# Patient Record
Sex: MALE | Race: WHITE | NOT HISPANIC OR LATINO | Employment: FULL TIME | URBAN - METROPOLITAN AREA
[De-identification: names, ages, dates, MRNs, and addresses within clinical notes are randomized per-mention and may not be internally consistent; named-entity substitution may affect disease eponyms.]

---

## 2017-05-30 ENCOUNTER — OFFICE VISIT (OUTPATIENT)
Dept: FAMILY MEDICINE | Facility: CLINIC | Age: 32
End: 2017-05-30
Payer: COMMERCIAL

## 2017-05-30 VITALS — HEIGHT: 74 IN | TEMPERATURE: 97.7 F | BODY MASS INDEX: 24.26 KG/M2 | WEIGHT: 189 LBS

## 2017-05-30 DIAGNOSIS — L23.7 CONTACT DERMATITIS DUE TO POISON IVY: Primary | ICD-10-CM

## 2017-05-30 PROCEDURE — 99213 OFFICE O/P EST LOW 20 MIN: CPT | Performed by: FAMILY MEDICINE

## 2017-05-30 RX ORDER — METHYLPREDNISOLONE 4 MG
TABLET, DOSE PACK ORAL
Qty: 21 TABLET | Refills: 0 | Status: SHIPPED | OUTPATIENT
Start: 2017-05-30

## 2017-05-30 NOTE — PATIENT INSTRUCTIONS
Managing a Poison Ivy, Poison Oak, or Poison Sumac Reaction    If you come in contact with urushiol  If you think you may have come in contact with the sap oil contained in these poison ivy, poison oak, and poison sumac plants (urishol), wash the affected part of your skin. Do this within 15 minutes after contact, using water or preferably, soap and water.  Undress, and wash your clothes and gear as soon as you can. Be sure to wash any pet that was with you. Taking these steps can help prevent spreading sap oil to someone else. If you have a rash, but are not sure if it is from one of these plants, consult with your health care provider.  To soothe the itching  Your skin may react to poison oak, poison ivy, and poison sumac within minutes to a few days after contact. Once you have come into contact with these plants, you can t stop the reaction. But you can take these steps to soothe the itching:    Don t scratch or scrub your rash, even if the itching is severe. Scratching can lead to infection.    Bathe in lukewarm (not hot) water or take short cool showers to relieve the itching. For a more soothing bath, add oatmeal to the water.    Use antihistamines that are taken by mouth (such as diphenhydramine). You can buy these at the drugstore. Talk to your health care provider or pharmacist for more information on oral antihistamines.    Use over-the-counter treatments on your skin, such as cortisone, compresses of Burow s solution, and calamine lotion.  How your skin may react  A mild rash may become red, swollen, and itchy. The rash may form a line on your skin where you brushed against the plant. If you have a severe rash, your itching may worsen. And your rash may blister and ooze. If this happens, seek medical care. The fluid from your blisters will not make your rash spread. With or without medical care, your rash may last up to 3 weeks. In the future, try to avoid coming in contact with these plants.  When to  call your health care provider  Call your health care provider if:    Your rash is severe    The rash spreads beyond the exposed part of your body or affects your face.    The rash does not clear up within a few weeks  You may be given medicine to take by mouth or apply directly on the skin. Go to the emergency room if you have any trouble breathing or swallowing or have any significant swelling.    3927-8955 The [employer]. [address] [place] [address]. All rights reserved. This information is not intended as a substitute for professional medical care. Always follow your healthcare professional's instructions.        Avoiding Poison Ivy, Poison Oak, and Poison Sumac  Poison oak, poison ivy, and poison sumac are plants that can cause skin rashes. The problem is urushiol, a sap oil contained in these plants. If you're allergic to urushiol, touching 1 of these plants may cause your skin to react. Within minutes or days, you may have a red, swollen, itchy rash. You can't stop the rash. But you can soothe the itching.        Recognizing these plants  You can help prevent a poison oak, poison ivy, or poison sumac rash. Know what these plants look like. And then avoid them. They grow in the form of ting, small plants, and large bushes. In most cases, poison oak and poison ivy have 3 leaves per stem. Poison sumac has from 7 leaves to 13 leaves per stem. Watch out for these plants when you go to any outdoor area, from a friend's overgrown back yard to the wilderness. Urushiol is present in these plants all year round, even when the leaves are gone. So always be on the lookout.  What causes a reaction?  Poison oak, poison ivy, and poison sumac thrive mainly in unmaintained outdoor areas. If you touch these plants, you may get a rash. You may also react if you touch something that came in contact with urushiol, such as a dog or cat, clothing, or equipment. But the rash caused by these plants is not  contagious.  Steps to prevention  When heading outdoors, take these preventive steps:    Avoid touching any of these plants.    Wear long pants and a long-sleeved shirt.    If you're going to a heavily wooded or brushy area, also put on gloves, a hat, and boots.    Apply bentoquatam 5% to all exposed areas of skin. This creates a layer of protection between your skin and any sap oil you may touch.    If you come in contact with these plants or the oil, wash with soap and water as soon as possible.    2839-5757 [employer]. [address] [place] [address]. All rights reserved. This information is not intended as a substitute for professional medical care. Always follow your healthcare professional's instructions.        Nonspecific Dermatitis  Dermatitis is a skin rash caused by something that touches the skin and makes it irritated and inflamed.  Your skin may be red, swollen, dry, and may be cracked. Blisters may form and ooze. The rash will itch.  Dermatitis can form on the face and neck, backs of hands, forearms, genitals, and lower legs. Dermatitis is not passed from person to person.  Talk with your health care provider about what may have caused the rash. Common things that cause skin allergies are metal in jewelry, plants like poison ivy or poison oak, and certain skin care products. You will need to avoid the source of your rash in the future to prevent it from coming back. In some cases, the cause of the dermatitis may not be found.  Treatment is done to relieve itching and prevent the rash from coming back. The rash should go away in a few days to a few weeks.  Home care  The health care provider may prescribe medications to relieve swelling and itching. Follow all instructions when using these medications.    Avoid anything that heats up your skin, such as hot showers or baths, or direct sunlight. This can make itching worse.    Stay away from whatever you think caused the  rash.    Bathe in warm, not hot, water. Apply a moisturizing lotion after bathing to prevent dry skin.    Avoid skin irritants such as wool or silk clothing, grease, oils, harsh soaps, and detergents.    Apply cold compresses to soothe your sores to help relieve your symptoms. Do this for 30 minutes 3 to 4 times a day. You can make a cold compress by soaking a cloth in cold water. Squeeze out excess water. You can add colloidal oatmeal to the water to help reduce itching. For severe itching in a small area, apply an ice pack wrapped in a thin towel. Do this for 20 minutes 3 to 4 times a day.    You can also help relieve large areas of itching by taking a lukewarm bath with colloidal oatmeal added to the water.    Use hydrocortisone cream for redness and irritation, unless another medicine was prescribed. You can also use benzocaine anesthetic cream or spray.    Use oral diphenhydramine to help reduce itching. This is an antihistamine you can buy at drug and grocery stores. It can make you sleepy, so use lower doses during the daytime. Or you can use loratadine. This is an antihistamine that will not make you sleepy. Don t use diphenhydramine if you have glaucoma or have trouble urinating because of an enlarged prostate.    Wash your hands or use an antibacterial gel often to prevent the spread of the rash.  Follow-up care  Follow up with your health care provider. Make an appointment with your health care provider if your symptoms do not get better in the next 1 to 2 weeks.  When to seek medical advice  Call your health care provider right away if any of these occur:    Spreading of the rash to other parts of your body    Severe swelling of your face, eyelids, mouth, throat or tongue    Trouble urinating due to swelling in the genital area    Fever of 100.4 F (38 C) or higher    Redness or swelling that gets worse    Pain that gets worse    Foul-smelling fluid leaking from the skin    Yellow-brown crusts on the open  blisters    Joint pain     8666-7719 [employer]. [address] [place] [address], [place] [address]. All rights reserved. This information is not intended as a substitute for professional medical care. Always follow your healthcare professional's instructions.        You can use Aveeno in your bathe wart

## 2017-05-30 NOTE — PROGRESS NOTES
"  SUBJECTIVE:                                                    Dipesh Gunn is a 31 year old male who presents to clinic today for the following health issues:    Possible Poison Oak - all over body - Started 10 days ago  Went into woods to get a lure     No previous     O: Temp 97.7  F (36.5  C) (Oral)  Ht 6' 2\" (1.88 m)  Wt 189 lb (85.7 kg)  BMI 24.27 kg/m2      All over face chest , back and legs     Chest wall normal to inspection and palpation. Good excursion bilaterally. Lungs clear to auscultation. Good air movement bilaterally without rales, wheezes, or rhonchi.   Regular rate and  rhythm. S1 and S2 normal, no murmurs, clicks, gallops or rubs. No edema or JVD.    The lesions on the arm are vesicular     The rest on his thorax are more irritative       ICD-10-CM    1. Contact dermatitis due to poison ivy L23.7 methylPREDNISolone (MEDROL DOSEPAK) 4 MG tablet           Reviewed and updated as needed this visit by clinical staff       Reviewed and updated as needed this visit by Provider             "

## 2017-05-30 NOTE — MR AVS SNAPSHOT
After Visit Summary   5/30/2017    Dipesh Gunn    MRN: [id number]           Patient Information     Date Of Birth          1985        Visit Information        Provider Department      5/30/2017 9:40 AM Mayito De La Torre MD [facility]        Today's Diagnoses     Contact dermatitis due to poison ivy    -  1      Care Instructions      Managing a Poison Ivy, Poison Oak, or Poison Sumac Reaction    If you come in contact with urushiol  If you think you may have come in contact with the sap oil contained in these poison ivy, poison oak, and poison sumac plants (urishol), wash the affected part of your skin. Do this within 15 minutes after contact, using water or preferably, soap and water.  Undress, and wash your clothes and gear as soon as you can. Be sure to wash any pet that was with you. Taking these steps can help prevent spreading sap oil to someone else. If you have a rash, but are not sure if it is from one of these plants, consult with your health care provider.  To soothe the itching  Your skin may react to poison oak, poison ivy, and poison sumac within minutes to a few days after contact. Once you have come into contact with these plants, you can t stop the reaction. But you can take these steps to soothe the itching:    Don t scratch or scrub your rash, even if the itching is severe. Scratching can lead to infection.    Bathe in lukewarm (not hot) water or take short cool showers to relieve the itching. For a more soothing bath, add oatmeal to the water.    Use antihistamines that are taken by mouth (such as diphenhydramine). You can buy these at the [employer]. Talk to your health care provider or pharmacist for more information on oral antihistamines.    Use over-the-counter treatments on your skin, such as cortisone, compresses of Burow s solution, and calamine lotion.  How your skin may react  A mild rash may become red, swollen, and itchy. The rash may form  a line on your skin where you brushed against the plant. If you have a severe rash, your itching may worsen. And your rash may blister and ooze. If this happens, seek medical care. The fluid from your blisters will not make your rash spread. With or without medical care, your rash may last up to 3 weeks. In the future, try to avoid coming in contact with these plants.  When to call your health care provider  Call your health care provider if:    Your rash is severe    The rash spreads beyond the exposed part of your body or affects your face.    The rash does not clear up within a few weeks  You may be given medicine to take by mouth or apply directly on the skin. Go to the emergency room if you have any trouble breathing or swallowing or have any significant swelling.    3331-9253 [employer]. [address] [place] [address]. All rights reserved. This information is not intended as a substitute for professional medical care. Always follow your healthcare professional's instructions.        Avoiding Poison Ivy, Poison Oak, and Poison Sumac  Poison oak, poison ivy, and poison sumac are plants that can cause skin rashes. The problem is urushiol, a sap oil contained in these plants. If you're allergic to urushiol, touching 1 of these plants may cause your skin to react. Within minutes or days, you may have a red, swollen, itchy rash. You can't stop the rash. But you can soothe the itching.        Recognizing these plants  You can help prevent a poison oak, poison ivy, or poison sumac rash. Know what these plants look like. And then avoid them. They grow in the form of ting, small plants, and large bushes. In most cases, poison oak and poison ivy have 3 leaves per stem. Poison sumac has from 7 leaves to 13 leaves per stem. Watch out for these plants when you go to any outdoor area, from a friend's overgrown back yard to the wilderness. Urushiol is present in these plants all year round, even  when the leaves are gone. So always be on the lookout.  What causes a reaction?  Poison oak, poison ivy, and poison sumac thrive mainly in unmaintained outdoor areas. If you touch these plants, you may get a rash. You may also react if you touch something that came in contact with urushiol, such as a dog or cat, clothing, or equipment. But the rash caused by these plants is not contagious.  Steps to prevention  When heading outdoors, take these preventive steps:    Avoid touching any of these plants.    Wear long pants and a long-sleeved shirt.    If you're going to a heavily wooded or brushy area, also put on gloves, a hat, and boots.    Apply bentoquatam 5% to all exposed areas of skin. This creates a layer of protection between your skin and any sap oil you may touch.    If you come in contact with these plants or the oil, wash with soap and water as soon as possible.    2132-0745 [employer]. [address] [place] [address]. All rights reserved. This information is not intended as a substitute for professional medical care. Always follow your healthcare professional's instructions.        Nonspecific Dermatitis  Dermatitis is a skin rash caused by something that touches the skin and makes it irritated and inflamed.  Your skin may be red, swollen, dry, and may be cracked. Blisters may form and ooze. The rash will itch.  Dermatitis can form on the face and neck, backs of hands, forearms, genitals, and lower legs. Dermatitis is not passed from person to person.  Talk with your health care provider about what may have caused the rash. Common things that cause skin allergies are metal in jewelry, plants like poison ivy or poison oak, and certain skin care products. You will need to avoid the source of your rash in the future to prevent it from coming back. In some cases, the cause of the dermatitis may not be found.  Treatment is done to relieve itching and prevent the rash from coming back.  The rash should go away in a few days to a few weeks.  Home care  The health care provider may prescribe medications to relieve swelling and itching. Follow all instructions when using these medications.    Avoid anything that heats up your skin, such as hot showers or baths, or direct sunlight. This can make itching worse.    Stay away from whatever you think caused the rash.    Bathe in warm, not hot, water. Apply a moisturizing lotion after bathing to prevent dry skin.    Avoid skin irritants such as wool or silk clothing, grease, oils, harsh soaps, and detergents.    Apply cold compresses to soothe your sores to help relieve your symptoms. Do this for 30 minutes 3 to 4 times a day. You can make a cold compress by soaking a cloth in cold water. Squeeze out excess water. You can add colloidal oatmeal to the water to help reduce itching. For severe itching in a small area, apply an ice pack wrapped in a thin towel. Do this for 20 minutes 3 to 4 times a day.    You can also help relieve large areas of itching by taking a lukewarm bath with colloidal oatmeal added to the water.    Use hydrocortisone cream for redness and irritation, unless another medicine was prescribed. You can also use benzocaine anesthetic cream or spray.    Use oral diphenhydramine to help reduce itching. This is an antihistamine you can buy at drug and grocery stores. It can make you sleepy, so use lower doses during the daytime. Or you can use loratadine. This is an antihistamine that will not make you sleepy. Don t use diphenhydramine if you have glaucoma or have trouble urinating because of an enlarged prostate.    Wash your hands or use an antibacterial gel often to prevent the spread of the rash.  Follow-up care  Follow up with your health care provider. Make an appointment with your health care provider if your symptoms do not get better in the next 1 to 2 weeks.  When to seek medical advice  Call your health care provider right away if  "any of these occur:    Spreading of the rash to other parts of your body    Severe swelling of your face, eyelids, mouth, throat or tongue    Trouble urinating due to swelling in the genital area    Fever of 100.4 F (38 C) or higher    Redness or swelling that gets worse    Pain that gets worse    Foul-smelling fluid leaking from the skin    Yellow-brown crusts on the open blisters    Joint pain     [id number] [employer]. [address] [place] [address]. All rights reserved. This information is not intended as a substitute for professional medical care. Always follow your healthcare professional's instructions.        You can use Aveeno in your bathe wart             Follow-ups after your visit        Who to contact     If you have questions or need follow up information about today's clinic visit or your schedule please contact [facility] directly at [phone].  Normal or non-critical lab and imaging results will be communicated to you by MyChart, letter or phone within 4 business days after the clinic has received the results. If you do not hear from us within 7 days, please contact the clinic through [employer] or phone. If you have a critical or abnormal lab result, we will notify you by phone as soon as possible.  Submit refill requests through [employer] or call your pharmacy and they will forward the refill request to us. Please allow 3 business days for your refill to be completed.          Additional Information About Your Visit        [employer] Information     [employer] lets you send messages to your doctor, view your test results, renew your prescriptions, schedule appointments and more. To sign up, go to [email or web address] . Click on \"Log in\" on the left side of the screen, which will take you to the Welcome page. Then click on \"Sign up Now\" on the right side of the page.     You will be asked to enter the access code listed below, as well as some personal " "information. Please follow the directions to create your username and password.     Your access code is: [email or web address]  Expires: 2017 10:05 AM     Your access code will  in 90 days. If you need help or a new code, please call your [place] clinic or [phone].        Care EveryWhere ID     This is your Care EveryWhere ID. This could be used by other organizations to access your [place] medical records  [id number]        Your Vitals Were     Temperature Height BMI (Body Mass Index)             97.7  F (36.5  C) (Oral) 6' 2\" (1.88 m) 24.27 kg/m2          Blood Pressure from Last 3 Encounters:   No data found for BP    Weight from Last 3 Encounters:   17 189 lb (85.7 kg)              Today, you had the following     No orders found for display         Today's Medication Changes          These changes are accurate as of: 17 10:05 AM.  If you have any questions, ask your nurse or doctor.               Start taking these medicines.        Dose/Directions    methylPREDNISolone 4 MG tablet   Commonly known as:  MEDROL DOSEPAK   Used for:  Contact dermatitis due to poison ivy   Started by:  Mayito De La Torre MD        follow package directions   Quantity:  21 tablet   Refills:  0            Where to get your medicines      These medications were sent to [facility] [address]  [address] [facility] [address]     Phone:  [phone]     methylPREDNISolone 4 MG tablet                Primary Care Provider Office Phone # Fax #    Favian Jiang [phone]       [facility] [address] [facility] [address]        Thank you!     Thank you for choosing [facility]  for your care. Our goal is always to provide you with excellent care. Hearing back from our patients is one way we can continue to improve our services. Please take a few minutes to complete the " written survey that you may receive in the mail after your visit with us. Thank you!             Your Updated Medication List - Protect others around you: Learn how to safely use, store and throw away your medicines at www.disposemymeds.org.          This list is accurate as of: 5/30/17 10:05 AM.  Always use your most recent med list.                   Brand Name Dispense Instructions for use    methylPREDNISolone 4 MG tablet    MEDROL DOSEPAK    21 tablet    follow package directions

## 2017-05-30 NOTE — NURSING NOTE
"Chief Complaint   Patient presents with     Derm Problem     Poison Oak?       Initial Temp 97.7  F (36.5  C) (Oral)  Ht 6' 2\" (1.88 m)  Wt 189 lb (85.7 kg)  BMI 24.27 kg/m2 Estimated body mass index is 24.27 kg/(m^2) as calculated from the following:    Height as of this encounter: 6' 2\" (1.88 m).    Weight as of this encounter: 189 lb (85.7 kg).  Medication Reconciliation: complete   Yin BARRON      "

## 2018-06-28 ENCOUNTER — TELEPHONE (OUTPATIENT)
Dept: FAMILY MEDICINE | Facility: CLINIC | Age: 33
End: 2018-06-28

## 2018-06-28 DIAGNOSIS — L23.7 CONTACT DERMATITIS DUE TO POISON IVY: Primary | ICD-10-CM

## 2018-06-28 RX ORDER — METHYLPREDNISOLONE 4 MG
TABLET, DOSE PACK ORAL
Qty: 21 TABLET | Refills: 0 | Status: SHIPPED | OUTPATIENT
Start: 2018-06-28

## 2018-06-28 NOTE — TELEPHONE ENCOUNTER
Called patient.  He was seen for a rash on 5/30/18 with Dr. De La Torre.  He stated that he was in [place] and may have gotten into poison ivy or oak again.  He has the same type of rash as when he saw provider.  It is all over on his legs and arms only.    He is requesting a steroidal cream or pills again.  He stated that provider advised him to call back if he got this again to get something over the phone.    Lulu Vela RN [facility] Triage.

## 2018-06-28 NOTE — TELEPHONE ENCOUNTER
Notified patient of the message below per provider.  Patient stated understanding and agreeable with the plan of care. Lulu Vela, RN-BSN, [facility] Triage.

## 2018-06-28 NOTE — TELEPHONE ENCOUNTER
Prescription called in for poison ivy.  Patient was given a Medrol Dosepak he should take the medication as directed in the package information.

## 2018-06-28 NOTE — TELEPHONE ENCOUNTER
Patient called today.    Patient has rash on body; needs stereroidal cream.    States Dr. KELLER at [facility] would honor request if rash occurred again.    Please contact patient.    Thank you.    Central Scheduling  Pia MEDRANO

## 2024-10-17 ENCOUNTER — TELEPHONE (OUTPATIENT)
Dept: DERMATOLOGY | Facility: CLINIC | Age: 39
End: 2024-10-17

## 2024-10-17 NOTE — TELEPHONE ENCOUNTER
[facility] Call Center    Appointment request form - Dermatology    May a detailed message be left on voicemail: no     Reason for Call: Appointment Intake    Referring Provider Name: self   Reason for appointment  Under eye skin  Preferred Visit Type  In-person      Action Taken: Other: Called patient back and LVM w/scheduling # [phone]    Travel Screening: Not Applicable     Date of Service: